# Patient Record
Sex: MALE | Race: OTHER | NOT HISPANIC OR LATINO | URBAN - METROPOLITAN AREA
[De-identification: names, ages, dates, MRNs, and addresses within clinical notes are randomized per-mention and may not be internally consistent; named-entity substitution may affect disease eponyms.]

---

## 2018-02-21 ENCOUNTER — EMERGENCY (EMERGENCY)
Facility: HOSPITAL | Age: 38
LOS: 1 days | Discharge: HOME | End: 2018-02-21
Attending: EMERGENCY MEDICINE | Admitting: EMERGENCY MEDICINE
Payer: COMMERCIAL

## 2018-02-21 VITALS
HEART RATE: 84 BPM | DIASTOLIC BLOOD PRESSURE: 75 MMHG | RESPIRATION RATE: 17 BRPM | TEMPERATURE: 99 F | OXYGEN SATURATION: 99 % | SYSTOLIC BLOOD PRESSURE: 114 MMHG

## 2018-02-21 PROCEDURE — 99283 EMERGENCY DEPT VISIT LOW MDM: CPT

## 2018-02-21 RX ORDER — ACETAMINOPHEN 500 MG
975 TABLET ORAL ONCE
Qty: 0 | Refills: 0 | Status: COMPLETED | OUTPATIENT
Start: 2018-02-21 | End: 2018-02-21

## 2018-02-21 RX ORDER — IBUPROFEN 200 MG
600 TABLET ORAL ONCE
Qty: 0 | Refills: 0 | Status: COMPLETED | OUTPATIENT
Start: 2018-02-21 | End: 2018-02-21

## 2018-02-21 RX ORDER — OXYCODONE HYDROCHLORIDE 5 MG/1
5 TABLET ORAL ONCE
Qty: 0 | Refills: 0 | Status: DISCONTINUED | OUTPATIENT
Start: 2018-02-21 | End: 2018-02-21

## 2018-02-21 RX ORDER — LIDOCAINE 4 G/100G
1 CREAM TOPICAL ONCE
Qty: 0 | Refills: 0 | Status: COMPLETED | OUTPATIENT
Start: 2018-02-21 | End: 2018-02-21

## 2018-02-21 RX ADMIN — Medication 600 MILLIGRAM(S): at 23:04

## 2018-02-21 RX ADMIN — Medication 975 MILLIGRAM(S): at 23:04

## 2018-02-21 RX ADMIN — LIDOCAINE 1 PATCH: 4 CREAM TOPICAL at 23:04

## 2018-02-21 RX ADMIN — Medication 60 MILLIGRAM(S): at 23:04

## 2018-02-21 NOTE — ED ADULT TRIAGE NOTE - CHIEF COMPLAINT QUOTE
Patient c/o right buttock pain radiating to right upper leg for 3 days. Patient had back surgery 2 years ago for herniated disk

## 2018-02-21 NOTE — ED PROVIDER NOTE - MEDICAL DECISION MAKING DETAILS
Attending note. Acute right lower back pain with paresthesias in the right thigh and buttocks. No suggestion of cauda equina. NSAIDs, Tylenol, steroids, Lidoderm patch, analgesia. Reassess in outpatient followup with spine center

## 2018-02-21 NOTE — ED ADULT NURSE NOTE - DISCHARGE TEACHING
Inge SANCHEZ, pt verbalizes understanding to f/u with PCP, Spine, and return to ED for any worsening symptoms.

## 2018-02-21 NOTE — ED PROVIDER NOTE - CARE PLAN
Principal Discharge DX:	Musculoskeletal pain  Assessment and plan of treatment:	REST INCREASE ACTIVITY AS TOLERATED  RETURN TO THE ER FOR ANY CONCERNS  TAKE MOTRIN 600 MG EVERY 8 HRS- TAKE WITH FOOD  PREDNISONE DAILY FOR 4 MORE DAYS  APPLY LIDODERM PATCH TO AFFECTED AREA FOR 12 HRS ON AND THEN 12 HRS OFF  RETURN TO THE ER FOR ANY CONCERNS  FOLLOW UP WITH DR LEE IN THE NEXT WEEK

## 2018-02-21 NOTE — ED PROVIDER NOTE - PLAN OF CARE
REST INCREASE ACTIVITY AS TOLERATED  RETURN TO THE ER FOR ANY CONCERNS  TAKE MOTRIN 600 MG EVERY 8 HRS- TAKE WITH FOOD  PREDNISONE DAILY FOR 4 MORE DAYS  APPLY LIDODERM PATCH TO AFFECTED AREA FOR 12 HRS ON AND THEN 12 HRS OFF  RETURN TO THE ER FOR ANY CONCERNS  FOLLOW UP WITH DR LEE IN THE NEXT WEEK

## 2018-02-21 NOTE — ED ADULT NURSE NOTE - OBJECTIVE STATEMENT
38 y/o M, [facility] surgery 2 years ago for herniated disk, presents to ED c/o burning pain to R buttock radiating to right posterior thigh x 3-4 days. Pt reports he took Motrin and Gabapentin yesterday for the pain with little relief and today did not take any meds. Pt denies bladder/bowel incontinence, weakness, or saddle anesthesia at this time. Pt able to ambulate with some pain. Pt reports pain is 2/10 when sitting and increased with laying down and standing/walking. Pt reports some tingling/decreased sensation to R buttock and R posterior thigh. Pt denies any recent injury or trauma. Safety maintained.  Family at bedside.

## 2018-02-21 NOTE — ED PROVIDER NOTE - PROGRESS NOTE DETAILS
Pt feeling better at this time, ambulating with steady gait- pt requesting to dc home. meds sent to pharmacy

## 2018-02-21 NOTE — ED PROVIDER NOTE - PHYSICAL EXAMINATION
Attending note. Patient is alert and in severe pain. Back is nontender to palpation. There are no skin lesions or rashes. Patient has tenderness in the right sciatic notch. Patient has full range of motion of his right hip without exacerbation of pain. Patient has paresthesia about the right thigh. There is no scrotal paresthesia or perianal paresthesia. Rectal exam was has normal tone. Straight leg raising is negative bilaterally. There is no clonus

## 2018-02-21 NOTE — ED PROVIDER NOTE - OBJECTIVE STATEMENT
36 yo male in room 6 accompanied by family here for evaluation of moderate to severe burning pain to right buttocks radiating to right posterior thigh for the past 3-4 days.  Pt states "I have a history of disc herniation to my lumbar spine and had surgery 2 years ago. I took motrin and gabapentin yesterday for the pain with little relief and today I did not take anything".  Pt denies  loss of bowel or bladder function, weakness, or saddle anesthesia at this time. Pt able to ambulate with some pain.  Pt reports some tingling to buttocks and posterior thigh. Pt denies recent injury or trauma. 38 yo male in room 6 accompanied by family here for evaluation of moderate to severe burning pain to right buttocks radiating to right posterior thigh for the past 3-4 days.  Pt states "I have a history of disc herniation to my lumbar spine and had surgery 2 years ago. I took motrin and gabapentin yesterday for the pain with little relief and today I did not take anything".  Pt denies  loss of bowel or bladder function, weakness, or saddle anesthesia at this time. Pt able to ambulate with some pain.  Pt reports some tingling to buttocks and posterior thigh. Pt denies recent injury or trauma.       Attending note. The patient was seen in fast track room #6. Agree with the above. Patient is complaining of atraumatic right lower back pain for 2-3 days. Pain is severe and radiates from the right lower back to the buttock to the thigh with paresthesia. Patient denies any bowel or bladder dysfunction. Patient has no saddle anesthesia. Patient has no fevers. Patient prior back surgery in 2016 at the hospital for joint disease. Patient Advil yesterday. Pain is exacerbated by movement.

## 2018-02-22 VITALS
TEMPERATURE: 98 F | OXYGEN SATURATION: 98 % | SYSTOLIC BLOOD PRESSURE: 136 MMHG | DIASTOLIC BLOOD PRESSURE: 79 MMHG | RESPIRATION RATE: 18 BRPM | HEART RATE: 68 BPM

## 2018-02-22 PROCEDURE — 99283 EMERGENCY DEPT VISIT LOW MDM: CPT

## 2018-02-22 RX ORDER — LIDOCAINE 4 G/100G
1 CREAM TOPICAL
Qty: 7 | Refills: 0 | OUTPATIENT
Start: 2018-02-22 | End: 2018-02-28

## 2018-02-22 RX ORDER — OXYCODONE HYDROCHLORIDE 5 MG/1
1 TABLET ORAL
Qty: 12 | Refills: 0 | OUTPATIENT
Start: 2018-02-22 | End: 2018-02-24

## 2018-02-22 RX ADMIN — OXYCODONE HYDROCHLORIDE 5 MILLIGRAM(S): 5 TABLET ORAL at 00:50

## 2018-02-22 RX ADMIN — Medication 975 MILLIGRAM(S): at 00:08

## 2018-02-22 RX ADMIN — OXYCODONE HYDROCHLORIDE 5 MILLIGRAM(S): 5 TABLET ORAL at 00:08

## 2018-02-22 RX ADMIN — Medication 600 MILLIGRAM(S): at 00:08

## 2018-02-22 NOTE — ED ADULT NURSE REASSESSMENT NOTE - NS ED NURSE REASSESS COMMENT FT1
Pt reports some improvement in R buttock/R posterior thigh pain. Pt reports 0/10 at rest and 10/10 burning sensation upon standing. Pt medicated as ordered. Resting comfortably in bed with family member at bedside. Safety maintained. Awaiting reassessment and dispo.

## 2018-02-27 ENCOUNTER — APPOINTMENT (OUTPATIENT)
Dept: ORTHOPEDIC SURGERY | Facility: CLINIC | Age: 38
End: 2018-02-27

## 2023-02-07 ENCOUNTER — APPOINTMENT (OUTPATIENT)
Dept: ORTHOPEDIC SURGERY | Facility: CLINIC | Age: 43
End: 2023-02-07
Payer: COMMERCIAL

## 2023-02-07 VITALS — HEIGHT: 72 IN | WEIGHT: 198 LBS | BODY MASS INDEX: 26.82 KG/M2

## 2023-02-07 DIAGNOSIS — Z78.9 OTHER SPECIFIED HEALTH STATUS: ICD-10-CM

## 2023-02-07 PROCEDURE — 99204 OFFICE O/P NEW MOD 45 MIN: CPT

## 2023-02-07 PROCEDURE — 73564 X-RAY EXAM KNEE 4 OR MORE: CPT | Mod: RT

## 2023-02-07 PROCEDURE — 99072 ADDL SUPL MATRL&STAF TM PHE: CPT

## 2023-02-07 NOTE — HISTORY OF PRESENT ILLNESS
[Gradual] : gradual [8] : 8 [1] : 2 [Dull/Aching] : dull/aching [de-identified] : 02/07/2023 pt is 42 years old female who present evaluation of randa knees. History of 2 meniscus surgeries. Has had PT on both knees. Started PT 8-9 months with no relieve. Initially had good relieve. States 8-9 months ago the pain began. Now has left knee pain. States when active the knees will start swelling and have pain and is unable to be active.\par \par Lateral arthroscopy and medial arthroscopy 12 years and 6 years ago\par \par

## 2023-02-07 NOTE — ASSESSMENT
[FreeTextEntry1] : 42 year M with right knee OA and left knee concern for meniscus tear. Pt has failed conservative tx with PT/NSAIDs.\par MRI for Meniscal tear and fu after completion.

## 2023-02-07 NOTE — IMAGING
[de-identified] : Constitutional: well developed and well nourished, able to communicate\par Cardiovascular: Peripheral vascular exam is grossly normal\par Neurologic: Alert and oriented, no acute distress.\par Skin: normal skin with no ulcers, rashes, or lesions\par Pulmonary: No respiratory distress, breathing comfortably on room air\par Lymphatics: No obvious lymphadenopathy or lymphedema in areas examined\par  \par RIght Knee Exam\par Alignment:  Valgus\par Effusion: None\par Atrophy: None                                                \par Stable to Varus/valgus stress\par Posterior Drawer Test: negative\par Anterior Drawer Test: Negative\par Knee Extension/Flexion: 0 / 120\par \par Medial/lateral compartments\par Medial joint line: No Tenderness\par Lateral joint line: No Tenderness\par Wesley test: negative\par \par Patellofemoral joint\par Medial patellar facet: no tenderness\par Patellar grind: Negative\par \par Tendons:\par Pes Anserine: No tenderness\par Gerdy’s Tubercle/ IT Band: No tenderness\par Quadriceps Tendon: No Tenderness\par patellar tendon: no Tenderness\par Tibial tubercle: not tenderness\par Calf: no Tenderness\par \par Neurovascular exam\par Muscle strength: 5/5\par Sensation to light touch: intact\par Distal pulses: 2+\par \par LEFT KNEE EXAM\par Alignment: Neutral\par Effusion: None\par Atrophy: None                                                 \par Stable to Varus/valgus stress\par Posterior Drawer Test: negative\par Anterior Drawer Test: Negative\par Knee Extension/Flexion: 0 / 120\par \par Medial/lateral compartments\par Medial joint line: POS Tenderness\par Lateral joint line: No Tenderness\par Wesley test: negative\par \par Patellofemoral joint\par Medial patellar facet: no tenderness\par Patellar grind: Negative\par \par Tendons:\par Pes Anserine: No tenderness\par Gerdy’s Tubercle/ IT Band: No tenderness\par Quadriceps Tendon: No Tenderness\par patellar tendon: no Tenderness\par Tibial tubercle: not tenderness\par Calf: no Tenderness\par \par Neurovascular exam\par Muscle strength: 5/5\par Sensation to light touch: intact\par Distal pulses: 2+\par \par IMAGING:\par 02/07/2023 Xrays of the bilateral Knee were taken demonstrating Right knee lateral joint space collapse with osteophytes. mild PF OA. Left knee no signs of fractures, dislocations,or significant arthritis. \par

## 2023-02-28 ENCOUNTER — APPOINTMENT (OUTPATIENT)
Dept: ORTHOPEDIC SURGERY | Facility: CLINIC | Age: 43
End: 2023-02-28

## 2023-03-15 ENCOUNTER — RESULT REVIEW (OUTPATIENT)
Age: 43
End: 2023-03-15

## 2023-03-28 ENCOUNTER — APPOINTMENT (OUTPATIENT)
Dept: ORTHOPEDIC SURGERY | Facility: CLINIC | Age: 43
End: 2023-03-28
Payer: COMMERCIAL

## 2023-03-28 VITALS — BODY MASS INDEX: 26.82 KG/M2 | WEIGHT: 198 LBS | HEIGHT: 72 IN

## 2023-03-28 DIAGNOSIS — M17.11 UNILATERAL PRIMARY OSTEOARTHRITIS, RIGHT KNEE: ICD-10-CM

## 2023-03-28 PROCEDURE — 99214 OFFICE O/P EST MOD 30 MIN: CPT

## 2023-03-28 NOTE — IMAGING
[de-identified] : Constitutional: well developed and well nourished, able to communicate\par Cardiovascular: Peripheral vascular exam is grossly normal\par Neurologic: Alert and oriented, no acute distress.\par Skin: normal skin with no ulcers, rashes, or lesions\par Pulmonary: No respiratory distress, breathing comfortably on room air\par Lymphatics: No obvious lymphadenopathy or lymphedema in areas examined\par  \par RIght Knee Exam\par Alignment:  Valgus\par Effusion: None\par Atrophy: None                                                \par Stable to Varus/valgus stress\par Posterior Drawer Test: negative\par Anterior Drawer Test: Negative\par Knee Extension/Flexion: 0 / 120\par \par Medial/lateral compartments\par Medial joint line: No Tenderness\par Lateral joint line: No Tenderness\par Wesley test: negative\par \par Patellofemoral joint\par Medial patellar facet: no tenderness\par Patellar grind: Negative\par \par Tendons:\par Pes Anserine: No tenderness\par Gerdy’s Tubercle/ IT Band: No tenderness\par Quadriceps Tendon: No Tenderness\par patellar tendon: no Tenderness\par Tibial tubercle: not tenderness\par Calf: no Tenderness\par \par Neurovascular exam\par Muscle strength: 5/5\par Sensation to light touch: intact\par Distal pulses: 2+\par \par LEFT KNEE EXAM\par Alignment: Neutral\par Effusion: None\par Atrophy: None                                                 \par Stable to Varus/valgus stress\par Posterior Drawer Test: negative\par Anterior Drawer Test: Negative\par Knee Extension/Flexion: 0 / 120\par \par Medial/lateral compartments\par Medial joint line: POS Tenderness\par Lateral joint line: No Tenderness\par Wesley test: negative\par \par Patellofemoral joint\par Medial patellar facet: no tenderness\par Patellar grind: Negative\par \par Tendons:\par Pes Anserine: No tenderness\par Gerdy’s Tubercle/ IT Band: No tenderness\par Quadriceps Tendon: No Tenderness\par patellar tendon: no Tenderness\par Tibial tubercle: not tenderness\par Calf: no Tenderness\par \par Neurovascular exam\par Muscle strength: 5/5\par Sensation to light touch: intact\par Distal pulses: 2+\par \par IMAGING:\par 02/07/2023 Xrays of the bilateral Knee were taken demonstrating Right knee lateral joint space collapse with osteophytes. mild PF OA. Left knee no signs of fractures, dislocations,or significant arthritis. \par \par MRI of the left knee - focal defect chondral lateral condyle, questionable meniscus tear medial

## 2023-03-28 NOTE — ASSESSMENT
[FreeTextEntry1] : 42 year M with right knee OA and left knee concern for meniscus tear. Pt has failed conservative tx with PT/NSAIDs.\par MRI for Meniscal tear and fu after completion.\par \par 03/28/2023 follow up with sports medicine for consideration for chondral procedures for left knee. Right knee discussed realignnment procedurs vs PKA vs TKA in the future.

## 2023-03-28 NOTE — HISTORY OF PRESENT ILLNESS
[Gradual] : gradual [8] : 8 [1] : 2 [Dull/Aching] : dull/aching [de-identified] : 02/07/2023 pt is 42 years old female who present evaluation of randa knees. History of 2 meniscus surgeries. Has had PT on both knees. Started PT 8-9 months with no relieve. Initially had good relieve. States 8-9 months ago the pain began. Now has left knee pain. States when active the knees will start swelling and have pain and is unable to be active.\par \par Lateral arthroscopy and medial arthroscopy 12 years and 6 years ago\par 03/28/2023 follow up MRI \par  [] : no

## 2023-03-30 ENCOUNTER — APPOINTMENT (OUTPATIENT)
Dept: ORTHOPEDIC SURGERY | Facility: CLINIC | Age: 43
End: 2023-03-30
Payer: COMMERCIAL

## 2023-03-30 PROCEDURE — 99214 OFFICE O/P EST MOD 30 MIN: CPT

## 2023-03-30 NOTE — IMAGING
[de-identified] : \par LEFT KNEE\par Inspection:  mild effusion\par Palpation: lateral femoral condyle tenderness \par Knee Range of Motion:  0-130 \par Strength: 5/5 Quadriceps strength, 5/5 Hamstring strength\par Neurological: light touch is intact throughout\par Ligament Stability and Special Tests: \par McMurrays: Positive\par Lachman: neg\par Pivot Shift: neg\par Posterior Drawer: neg\par Valgus: neg\par Varus: neg\par Patella Apprehension: neg\par Patella Maltracking: neg\par \par

## 2023-03-30 NOTE — HISTORY OF PRESENT ILLNESS
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 )   chronic left knee pain since 2022 worseing since Feb 2023.  seen dr. Monaco for both knees and sent for consult for left knee chondral defect.\par The pain is located  lateral, ant, and deep\par The pain is associated with  swelling, catching and buckling\par Worse with activity and better at rest.\par Has tried PT sine june 2022\par H/O opposite right knee arthritis seens Dr. Monaco for\par h/o right meniscus surgery\par

## 2023-03-30 NOTE — DISCUSSION/SUMMARY
[de-identified] : The patient was advised of the diagnosis. The natural history of the pathology was explained in full to the patient in layman's terms. Several different treatment options were discussed and explained to the patient including the risks and benefits of both surgical and non-surgical treatments.\par \par The risks, benefits, and alternatives to surgical arthroscopy of the left knee with abrasion chondroplasty, synovectomy, possible partial meniscotomy, and articular cartilage biopsy (for subsequent autologous chondrocyte implantation) were reviewed with the patient.   Specifically, I reviewed that any anterior knee pain may paradoxically worsen for the first six weeks following arthroscopy due to quadriceps weakness.  We also discussed that the risks of surgery include but are not limited to pain, infection (superficial or deep), bleeding, vascular injury, numbness, tingling, nerve damage (direct or indirect), scarring, wound breakdown, failure to resolve symptoms, symptom recurrence, the need for further surgery as well as medical complications such as blood clots, pulmonary embolism, heart attack, stroke, and other anesthesia complications including even death.  \par \par They understood all the risks, accepted them, and understood that other complications could occur that are not mentioned above.  The intraoperative plan, post-operative plan, post-operative expectations and limitations were explained in full.  Expectations from non-surgical treatment were explained in full as well.  They demonstrated a complete understanding of the treatment alternatives and requested to proceed with surgery.  This will be scheduled accordingly.\par

## 2023-03-30 NOTE — ASSESSMENT
[FreeTextEntry1] : notes from dr. cheema reviewed\par Imaging was reviewed and independently interpreted\par xrays b/l knee 2/7/23 - right knee severe lat deg changes, left knee mild deg pf\par mri left knee ZP 3/15/23 - focal chondral defect of the anterior LFC into trochlea 1.7cm AP X 1.2cm ML, undersurface MMT, synov\par \par \par - We discussed their diagnosis and treatment options at length including the risks and benefits of both surgical and non-surgical options.\par - Given their active lifestyle along with continued pain and mechanical symptoms despite conservative treatment they are a surgical candidate.\par - The risks, benefits, and alternatives to left knee abras (LFC), poss PMM, synov, cartilage biopsy were discussed with the patient, all questions were answered.\par

## 2023-03-30 NOTE — DATA REVIEWED
[MRI] : MRI [Outside X-rays] : outside x-rays [Left] : left [Knee] : knee [I independently reviewed and interpreted images and report] : I independently reviewed and interpreted images and report

## 2023-04-14 ENCOUNTER — APPOINTMENT (OUTPATIENT)
Age: 43
End: 2023-04-14
Payer: COMMERCIAL

## 2023-04-14 PROCEDURE — 29870 ARTHRS KNEE DX W/WO SYN BX: CPT | Mod: AS,59,LT

## 2023-04-14 PROCEDURE — 29879 ARTHRS KNE SRG ABRASJ ARTHRP: CPT | Mod: 59,LT

## 2023-04-14 PROCEDURE — 29875 ARTHRS KNEE SURG SYNVCT LMTD: CPT | Mod: 59,LT

## 2023-04-14 PROCEDURE — 29870 ARTHRS KNEE DX W/WO SYN BX: CPT | Mod: 59,LT

## 2023-04-14 PROCEDURE — 29875 ARTHRS KNEE SURG SYNVCT LMTD: CPT | Mod: AS,59,LT

## 2023-04-14 PROCEDURE — 29879 ARTHRS KNE SRG ABRASJ ARTHRP: CPT | Mod: AS,59,LT

## 2023-04-14 PROCEDURE — 29881 ARTHRS KNE SRG MNISECTMY M/L: CPT | Mod: AS,LT

## 2023-04-14 PROCEDURE — 29881 ARTHRS KNE SRG MNISECTMY M/L: CPT | Mod: LT

## 2023-04-14 RX ORDER — ACETAMINOPHEN 500 MG/1
500 TABLET ORAL 3 TIMES DAILY
Qty: 90 | Refills: 0 | Status: ACTIVE | COMMUNITY
Start: 2023-04-14 | End: 1900-01-01

## 2023-04-14 RX ORDER — IBUPROFEN 600 MG/1
600 TABLET, FILM COATED ORAL EVERY 6 HOURS
Qty: 20 | Refills: 0 | Status: ACTIVE | COMMUNITY
Start: 2023-04-14 | End: 1900-01-01

## 2023-04-14 RX ORDER — PROMETHAZINE HYDROCHLORIDE 12.5 MG/1
12.5 TABLET ORAL EVERY 6 HOURS
Qty: 20 | Refills: 0 | Status: ACTIVE | COMMUNITY
Start: 2023-04-14 | End: 1900-01-01

## 2023-04-14 RX ORDER — OXYCODONE 5 MG/1
5 TABLET ORAL
Qty: 10 | Refills: 0 | Status: ACTIVE | COMMUNITY
Start: 2023-04-14 | End: 1900-01-01

## 2023-04-20 ENCOUNTER — APPOINTMENT (OUTPATIENT)
Dept: ORTHOPEDIC SURGERY | Facility: CLINIC | Age: 43
End: 2023-04-20
Payer: COMMERCIAL

## 2023-04-20 VITALS — WEIGHT: 198 LBS | HEIGHT: 72 IN | BODY MASS INDEX: 26.82 KG/M2

## 2023-04-20 PROCEDURE — 99024 POSTOP FOLLOW-UP VISIT: CPT

## 2023-04-20 NOTE — ASSESSMENT
[FreeTextEntry1] : s/p L knee abras (C 1.8cm AP X 1.4cm ML), PMM, synov, biopsy on 4/14/23\par \par - PT on post op protocol\par - The patient was provided with a prescription for Physical Therapy \par - Home exercises program learned at physical therapy.\par - The patient was advised to apply ice (wrapped in a towel or protective covering) to the area daily (20 minutes at a time, 2-4X/day).\par - fu 8 week re-eval\par \par - also discussed the need for 2nd surgery in the future for ACI as he has a complicated injury with risk to pose threat to the overall function of his knee if not treated\par - r/b/a L knee ACI ( [facility] [place]) discussed at length and he wants to proceed in aug\par

## 2023-04-20 NOTE — DISCUSSION/SUMMARY
[de-identified] : The patient  was advised of the diagnosis. The natural history of the pathology was explained in full in layman's terms. Several different treatment options were discussed and explained including the risks and benefits of both surgical and non-surgical treatments.\par \par The risks, benefits, and alternatives to left knee open autologous chondrocyte implantation were reviewed. Specifically, I reviewed that there is an extensive post-op rehab course and immediate post-op restrictions. \par \par We also discussed that the risks of surgery include but are not limited to pain, infection (superficial or deep), bleeding, vascular injury, numbness, tingling, nerve damage (direct or indirect), scarring, non-healing, wound breakdown, failure to resolve symptoms, symptom recurrence, the need for further surgery, as well as medical complications such as blood clots, pulmonary embolism, heart attack, stroke, and other anesthesia complications including even death.  We also discussed that there could be injury to the infrapatellar branch of the saphenous nerve, causing permanent numbness near the incision.  They understood all the risks, accepted them, and understood that other complications could occur that are not mentioned above.  \par \par The intraoperative plan, post-operative plan, post-operative expectations and limitations were explained in full. The importance of postoperative rehabilitation and restriction compliance was emphasized. Expectations from non-surgical treatment were explained in full as well.  They demonstrated a complete understanding of the treatment and alternatives.\par

## 2023-04-20 NOTE — HISTORY OF PRESENT ILLNESS
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  seen dr. Monaco for both knees and sent for consult for left knee chondral defect.\par The pain is located medial, lateral, ant, and deep\par The pain is associated with  swelling, catching and buckling\par Worse with activity and better at rest.\par Has tried PT sine june 2022\par H/O opposite right knee arthritis seens Dr. Monaco for\par h/o right meniscus surgery\par \par ***s/p L knee abras (MFC 1.8cm AP X 1.4cm ML), PMM, synov, biopsy on 4/14/23***\par \par 4/20/23 - po visit, no new issues

## 2023-04-20 NOTE — IMAGING
[de-identified] : \par  LEFT KNEE\par Inspection:  mild effusion, incisions c/d/i\par Palpation: medial facet ttp and MFC\par Knee Range of Motion:  0-120\par Strength: 4/5 Quadriceps strength, 5/5 Hamstring strength\par Neurological: light touch is intact throughout\par Ligament Stability and Special Tests: \par McMurrays: neg\par Lachman: neg\par Pivot Shift: neg\par Posterior Drawer: neg\par Valgus: neg\par Varus: neg\par Patella Apprehension: neg\par Patella Maltracking: neg\par

## 2023-05-09 ENCOUNTER — APPOINTMENT (OUTPATIENT)
Dept: ORTHOPEDIC SURGERY | Facility: CLINIC | Age: 43
End: 2023-05-09

## 2023-06-15 ENCOUNTER — APPOINTMENT (OUTPATIENT)
Dept: ORTHOPEDIC SURGERY | Facility: CLINIC | Age: 43
End: 2023-06-15
Payer: COMMERCIAL

## 2023-06-15 VITALS — WEIGHT: 205 LBS | BODY MASS INDEX: 28.7 KG/M2 | HEIGHT: 71 IN

## 2023-06-15 PROCEDURE — 99024 POSTOP FOLLOW-UP VISIT: CPT

## 2023-06-15 NOTE — IMAGING
[de-identified] : \par  LEFT KNEE\par Inspection:  mild effusion, incisions c/d/i\par Palpation: medial facet ttp and MFC\par Knee Range of Motion:  0-135\par Strength: 5/5 Quadriceps strength, 5/5 Hamstring strength\par Neurological: light touch is intact throughout\par Ligament Stability and Special Tests: \par McMurrays: neg\par Lachman: neg\par Pivot Shift: neg\par Posterior Drawer: neg\par Valgus: neg\par Varus: neg\par Patella Apprehension: neg\par Patella Maltracking: neg\par

## 2023-06-15 NOTE — DISCUSSION/SUMMARY
[de-identified] : The patient  was advised of the diagnosis. The natural history of the pathology was explained in full in layman's terms. Several different treatment options were discussed and explained including the risks and benefits of both surgical and non-surgical treatments.\par \par The risks, benefits, and alternatives to left knee open autologous chondrocyte implantation were reviewed. Specifically, I reviewed that there is an extensive post-op rehab course and immediate post-op restrictions. \par \par We also discussed that the risks of surgery include but are not limited to pain, infection (superficial or deep), bleeding, vascular injury, numbness, tingling, nerve damage (direct or indirect), scarring, non-healing, wound breakdown, failure to resolve symptoms, symptom recurrence, the need for further surgery, as well as medical complications such as blood clots, pulmonary embolism, heart attack, stroke, and other anesthesia complications including even death.  We also discussed that there could be injury to the infrapatellar branch of the saphenous nerve, causing permanent numbness near the incision.  They understood all the risks, accepted them, and understood that other complications could occur that are not mentioned above.  \par \par The intraoperative plan, post-operative plan, post-operative expectations and limitations were explained in full. The importance of postoperative rehabilitation and restriction compliance was emphasized. Expectations from non-surgical treatment were explained in full as well.  They demonstrated a complete understanding of the treatment and alternatives.\par

## 2023-06-15 NOTE — HISTORY OF PRESENT ILLNESS
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  seen dr. Monaco for both knees and sent for consult for left knee chondral defect.\par The pain is located medial, lateral, ant, and deep\par The pain is associated with  swelling, catching and buckling\par Worse with activity and better at rest.\par Has tried PT sine june 2022\par H/O opposite right knee arthritis seens Dr. Monaco for\par h/o right meniscus surgery\par \par ***s/p L knee abras (MFC 1.8cm AP X 1.4cm ML), PMM, synov, biopsy on 4/14/23***\par \par 4/20/23 - po visit, no new issues\par 6/15/23 - doing PT/HEP in [place] still some pain in knee

## 2023-06-15 NOTE — ASSESSMENT
[FreeTextEntry1] : s/p L knee abras (MFC 1.8cm AP X 1.4cm ML), PMM, synov, biopsy on 4/14/23\par \par \par - The patient was advised of the diagnosis.  The natural history of the pathology was explained to the patient in layman's terms.  Several different treatment options were discussed and explained including the risks and benefits of both surgical and non-surgical treatments.  All questions and concerns were answered.\par - cont PT /HEP on post op protocol\par -  discussed the need for 2nd surgery in the future for ACI as he has a complicated injury with risk to pose threat to the overall function of his knee if not treated\par - r/b/a L knee ACI ( [facility] [place] [place]) discussed at length and he wants to proceed \par

## 2023-07-28 ENCOUNTER — APPOINTMENT (OUTPATIENT)
Age: 43
End: 2023-07-28
Payer: COMMERCIAL

## 2023-07-28 PROCEDURE — 27412 AUTOCHONDROCYTE IMPLANT KNEE: CPT | Mod: LT

## 2023-07-28 PROCEDURE — 27412 AUTOCHONDROCYTE IMPLANT KNEE: CPT | Mod: AS,LT

## 2023-07-28 RX ORDER — PROMETHAZINE HYDROCHLORIDE 12.5 MG/1
12.5 TABLET ORAL EVERY 6 HOURS
Qty: 20 | Refills: 0 | Status: ACTIVE | COMMUNITY
Start: 2023-07-28 | End: 1900-01-01

## 2023-07-28 RX ORDER — ACETAMINOPHEN 500 MG/1
500 TABLET ORAL 3 TIMES DAILY
Qty: 90 | Refills: 0 | Status: ACTIVE | COMMUNITY
Start: 2023-07-28 | End: 1900-01-01

## 2023-07-28 RX ORDER — IBUPROFEN 600 MG/1
600 TABLET ORAL EVERY 6 HOURS
Qty: 20 | Refills: 0 | Status: ACTIVE | COMMUNITY
Start: 2023-07-28 | End: 1900-01-01

## 2023-07-28 RX ORDER — OXYCODONE 5 MG/1
5 TABLET ORAL
Qty: 30 | Refills: 0 | Status: ACTIVE | COMMUNITY
Start: 2023-07-28 | End: 1900-01-01

## 2023-07-28 RX ORDER — ASPIRIN/CALCIUM/MAG/ALUMINUM 325 MG
325 TABLET ORAL
Qty: 30 | Refills: 0 | Status: ACTIVE | COMMUNITY
Start: 2023-07-28 | End: 1900-01-01

## 2023-08-03 ENCOUNTER — APPOINTMENT (OUTPATIENT)
Dept: ORTHOPEDIC SURGERY | Facility: CLINIC | Age: 43
End: 2023-08-03
Payer: COMMERCIAL

## 2023-08-03 DIAGNOSIS — M23.8X2 OTHER INTERNAL DERANGEMENTS OF LEFT KNEE: ICD-10-CM

## 2023-08-03 PROCEDURE — 99024 POSTOP FOLLOW-UP VISIT: CPT

## 2023-08-03 NOTE — ASSESSMENT
[FreeTextEntry1] :  s/p L knee MARICRUZ ([facility]) on 7/28/23  - PT on post op protocol - The patient was provided with a prescription for Physical Therapy  - Home exercises program learned at physical therapy. - The patient was advised to apply ice (wrapped in a towel or protective covering) to the area daily (20 minutes at a time, 2-4X/day). - fu 8 week re-eval

## 2023-08-03 NOTE — IMAGING
[de-identified] :   LEFT KNEE Inspection:  incisions c/d/i,  mod effusion  Palpation: no ttp Knee Range of Motion:  0-60 degrees Strength: 4/5 Quadriceps strength, 4+/5 Hamstring strength Neurological: light touch is intact throughout Ligament Stability and Special Tests:  McMurrays: neg Lachman: neg Pivot Shift: neg Posterior Drawer: neg Valgus: neg Varus: neg Patella Apprehension: neg Patella Maltracking: neg

## 2023-08-03 NOTE — HISTORY OF PRESENT ILLNESS
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  seen dr. Monaco for both knees and sent for consult for left knee chondral defect. The pain is located medial, lateral, ant, and deep The pain is associated with  swelling, catching and buckling Worse with activity and better at rest. Has tried PT sine june 2022 H/O opposite right knee arthritis seens Dr. Monaco for h/o right meniscus surgery  ***s/p L knee abras (C 1.8cm AP X 1.4cm ML), PMM, synov, biopsy on 4/14/23***  4/20/23 - po visit, no new issues 6/15/23 - doing PT/HEP in [place] still some pain in knee  *** s/p L knee MARICRUZ ([facility]) on 7/28/23***  8/3/23 - po visit , using brace and crtuches, pain well controlled

## 2023-09-28 ENCOUNTER — APPOINTMENT (OUTPATIENT)
Dept: ORTHOPEDIC SURGERY | Facility: CLINIC | Age: 43
End: 2023-09-28
Payer: COMMERCIAL

## 2023-09-28 PROCEDURE — 99024 POSTOP FOLLOW-UP VISIT: CPT

## 2023-11-16 ENCOUNTER — APPOINTMENT (OUTPATIENT)
Dept: ORTHOPEDIC SURGERY | Facility: CLINIC | Age: 43
End: 2023-11-16
Payer: COMMERCIAL

## 2023-11-16 PROCEDURE — 99213 OFFICE O/P EST LOW 20 MIN: CPT

## 2024-01-11 ENCOUNTER — APPOINTMENT (OUTPATIENT)
Dept: ORTHOPEDIC SURGERY | Facility: CLINIC | Age: 44
End: 2024-01-11
Payer: COMMERCIAL

## 2024-01-11 VITALS — HEIGHT: 71 IN | BODY MASS INDEX: 28.7 KG/M2 | WEIGHT: 205 LBS

## 2024-01-11 PROCEDURE — 99213 OFFICE O/P EST LOW 20 MIN: CPT

## 2024-01-11 NOTE — IMAGING
[de-identified] :  LEFT KNEE Inspection:  well healed surg scars, mild effusion  Palpation: medial facet ttp Knee Range of Motion:  0-130 degrees Strength: 5-/5 Quadriceps strength, 5-/5 Hamstring strength, 4/5 hip abductors Neurological: light touch is intact throughout Ligament Stability and Special Tests:  McMurrays: neg Lachman: neg Pivot Shift: neg Posterior Drawer: neg Valgus: neg Varus: neg Patella Apprehension: neg Patella Maltracking: neg

## 2024-01-11 NOTE — HISTORY OF PRESENT ILLNESS
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  seen dr. Monaco for both knees and sent for consult for left knee chondral defect. The pain is located medial, lateral, ant, and deep The pain is associated with  swelling, catching and buckling Worse with activity and better at rest. Has tried PT sine june 2022 H/O opposite right knee arthritis seens Dr. Monaco for h/o right meniscus surgery  ***s/p L knee abras (C 1.8cm AP X 1.4cm ML), PMM, synov, biopsy on 4/14/23***  4/20/23 - po visit, no new issues 6/15/23 - doing PT/HEP in [place] still some pain in knee  *** [place] [place]***  8/3/23 - po visit , using brace and crtuches, pain well controlled 9/28/23 - doing PT on protocol 11/16/23 - cont with PT and HEP working with Birgit  1/11/24- cont with PT and HEP, improving

## 2024-01-11 NOTE — ASSESSMENT
[FreeTextEntry1] : s/p L knee MARICRUZ ([facility] [place]) on 7/28/23   - The patient was advised of the diagnosis. The natural history of the pathology was explained to the patient in layman's terms. Several different treatment options were discussed and explained including the risks and benefits of both surgical and non-surgical treatments. All questions and concerns were answered. - We will continue conservative treatment with PT, icing, and anti-inflammatory medications. - The patient was provided with a prescription for Physical Therapy - Home exercises program learned at physical therapy. - The patient was advised to apply ice (wrapped in a towel or protective covering) to the area daily (20 minutes at a time, 2-4X/day). - fu 8 week re-eval.

## 2024-03-07 ENCOUNTER — APPOINTMENT (OUTPATIENT)
Dept: ORTHOPEDIC SURGERY | Facility: CLINIC | Age: 44
End: 2024-03-07
Payer: COMMERCIAL

## 2024-03-07 PROCEDURE — 99213 OFFICE O/P EST LOW 20 MIN: CPT

## 2024-03-07 NOTE — IMAGING
[de-identified] :   LEFT KNEE Inspection:  well healed surg scars, mild effusion  Palpation: medial facet ttp Knee Range of Motion:  0-130 degrees Strength: 5-/5 Quadriceps strength, 5-/5 Hamstring strength, 4/5 hip abductors Neurological: light touch is intact throughout Ligament Stability and Special Tests:  McMurrays: neg Lachman: neg Pivot Shift: neg Posterior Drawer: neg Valgus: neg Varus: neg Patella Apprehension: neg Patella Maltracking: neg

## 2024-03-07 NOTE — HISTORY OF PRESENT ILLNESS
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  seen dr. Monaco for both knees and sent for consult for left knee chondral defect. The pain is located medial, lateral, ant, and deep The pain is associated with  swelling, catching and buckling Worse with activity and better at rest. Has tried PT sine june 2022 H/O opposite right knee arthritis seens Dr. Monaco for h/o right meniscus surgery  ***s/p L knee abras (C 1.8cm AP X 1.4cm ML), PMM, synov, biopsy on 4/14/23***  4/20/23 - po visit, no new issues 6/15/23 - doing PT/HEP in [place] still some pain in knee  *** s/p L knee MARICRUZ ([facility] [place] on 7/28/23***  8/3/23 - po visit , using brace and crtuches, pain well controlled 9/28/23 - doing PT on protocol 11/16/23 - cont with PT and HEP working with Birgit  1/11/24- cont with PT and HEP, improving 3/7/24- L knee improving although has some occasional discomfort with stairs and achiness with activtiy

## 2024-03-07 NOTE — ASSESSMENT
[FreeTextEntry1] : s/p L knee MARICRUZ ([facility] [place]) on 7/28/23   - The patient was advised of the diagnosis. The natural history of the pathology was explained to the patient in layman's terms. Several different treatment options were discussed and explained including the risks and benefits of both surgical and non-surgical treatments. All questions and concerns were answered. - We will continue conservative treatment with PT, icing, and anti-inflammatory medications. - The patient was provided with a prescription for Physical Therapy - Home exercises program learned at physical therapy. - The patient was advised to apply ice (wrapped in a towel or protective covering) to the area daily (20 minutes at a time, 2-4X/day). - fu 8 week re-eval, if cont symptoms consider visco injections

## 2024-05-02 ENCOUNTER — APPOINTMENT (OUTPATIENT)
Dept: ORTHOPEDIC SURGERY | Facility: CLINIC | Age: 44
End: 2024-05-02
Payer: COMMERCIAL

## 2024-05-02 DIAGNOSIS — M23.92 UNSPECIFIED INTERNAL DERANGEMENT OF LEFT KNEE: ICD-10-CM

## 2024-05-02 DIAGNOSIS — M22.2X2 PATELLOFEMORAL DISORDERS, LEFT KNEE: ICD-10-CM

## 2024-05-02 DIAGNOSIS — M17.12 UNILATERAL PRIMARY OSTEOARTHRITIS, LEFT KNEE: ICD-10-CM

## 2024-05-02 PROCEDURE — 73564 X-RAY EXAM KNEE 4 OR MORE: CPT | Mod: LT

## 2024-05-02 PROCEDURE — 99214 OFFICE O/P EST MOD 30 MIN: CPT

## 2024-05-02 NOTE — IMAGING
[de-identified] :   LEFT KNEE Inspection:  well healed surg scars, mild effusion  Palpation: medial facet ttp Knee Range of Motion:  0-130 degrees Strength: 5-/5 Quadriceps strength, 5-/5 Hamstring strength, 4/5 hip abductors Neurological: light touch is intact throughout Ligament Stability and Special Tests:  McMurrays: neg Lachman: neg Pivot Shift: neg Posterior Drawer: neg Valgus: neg Varus: neg Patella Apprehension: neg Patella Maltracking: neg

## 2024-05-02 NOTE — HISTORY OF PRESENT ILLNESS
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  seen dr. Monaco for both knees and sent for consult for left knee chondral defect. The pain is located medial, lateral, ant, and deep The pain is associated with  swelling, catching and buckling Worse with activity and better at rest. Has tried PT sine june 2022 H/O opposite right knee arthritis seens Dr. Monaco for h/o right meniscus surgery  ***s/p L knee abras (C 1.8cm AP X 1.4cm ML), PMM, synov, biopsy on 4/14/23***  4/20/23 - po visit, no new issues 6/15/23 - doing PT/HEP in [place] still some pain in knee  *** s/p L knee MARICRUZ ([facility] [place]) on 7/28/23***  8/3/23 - po visit , using brace and crtuches, pain well controlled 9/28/23 - doing PT on protocol 11/16/23 - cont with PT and HEP working with Birgit  1/11/24- cont with PT and HEP, improving 3/7/24- L knee improving although has some occasional discomfort with stairs and achiness with activtiy 5/2/24- L knee improving, complains of some discomfort with attempting to jog, has been doing HEP

## 2024-05-02 NOTE — ASSESSMENT
[FreeTextEntry1] : s/p L knee MARICRUZ ([facility]) on 7/28/23  Left X-Ray Examination of the KNEE (4 views): there are no fractures, subluxations or dislocations. mild pf deg   - The patient was advised of the diagnosis. The natural history of the pathology was explained to the patient in layman's terms. Several different treatment options were discussed and explained including the risks and benefits of both surgical and non-surgical treatments. All questions and concerns were answered. - We will continue conservative treatment with PT, icing, and anti-inflammatory medications. - The patient was provided with a prescription for Physical Therapy - Home exercises program learned at physical therapy. - The patient was advised to apply ice (wrapped in a towel or protective covering) to the area daily (20 minutes at a time, 2-4X/day). - new mri eval chondral defect for fill or new tearing since not improving with PT -fu after mri  (can considier CSI or visco injections we spoke about )   Medication Discussion: 1) We discussed a comprehensive treatment plan that included possible pharmaceutical management involving the use of prescription strength medications including but not limited to options such as oral Naprosyn 500mg BID, once daily Meloxicam 15 mg, or 500-650 mg Tylenol versus over the counter oral medications in addition to discussing possible topical prescription Pennsaid vs  Voltaren gel. 2) There is a moderate risk of morbidity with further treatment, especially from use of prescription strength medications and possible side effects of these medications which include but are not limited to upset stomach with oral medications, skin reactions to topical medications and GI/cardiac/renal issues with long term use. 3) I recommended that the patient follow-up with their medical physician if there are any significant potential issues with long term medication use such as interactions with current medications or with exacerbation of underlying medical comorbidities. 4) The benefits and risks associated with use of oral and / or topical prescription and over the counter anti-inflammatory medications were discussed with the patient. The patient voiced understanding of the risks including but not limited to bleeding, stroke, kidney dysfunction, heart disease, and were referred to the black box warning label for further information.

## 2024-06-06 ENCOUNTER — RESULT REVIEW (OUTPATIENT)
Age: 44
End: 2024-06-06

## 2024-06-10 PROBLEM — M65.9 SYNOVITIS OF KNEE: Status: ACTIVE | Noted: 2024-06-10

## 2024-06-10 PROBLEM — S83.32XD TEAR OF ARTICULAR CARTILAGE OF LEFT KNEE AS CURRENT INJURY, SUBSEQUENT ENCOUNTER: Status: ACTIVE | Noted: 2023-04-18

## 2024-06-10 PROBLEM — S83.232A COMPLEX TEAR OF MEDIAL MENISCUS OF LEFT KNEE AS CURRENT INJURY, INITIAL ENCOUNTER: Status: ACTIVE | Noted: 2024-06-10

## 2024-06-11 ENCOUNTER — APPOINTMENT (OUTPATIENT)
Dept: ORTHOPEDIC SURGERY | Facility: CLINIC | Age: 44
End: 2024-06-11
Payer: COMMERCIAL

## 2024-06-11 DIAGNOSIS — M65.9 SYNOVITIS AND TENOSYNOVITIS, UNSPECIFIED: ICD-10-CM

## 2024-06-11 DIAGNOSIS — S83.32XD TEAR OF ARTICULAR CARTILAGE OF LEFT KNEE, CURRENT, SUBSEQUENT ENCOUNTER: ICD-10-CM

## 2024-06-11 DIAGNOSIS — S83.232A COMPLEX TEAR OF MEDIAL MENISCUS, CURRENT INJURY, LEFT KNEE, INITIAL ENCOUNTER: ICD-10-CM

## 2024-06-11 PROCEDURE — 99214 OFFICE O/P EST MOD 30 MIN: CPT

## 2024-06-11 NOTE — IMAGING
[de-identified] :  LEFT KNEE Inspection:  well healed surg scars, mild effusion  Palpation: medial facet ttp and medial joint line ttp Knee Range of Motion:  0-130 degrees Strength: 5-/5 Quadriceps strength, 5-/5 Hamstring strength, 4/5 hip abductors Neurological: light touch is intact throughout Ligament Stability and Special Tests:  McMurrays: pos Lachman: neg Pivot Shift: neg Posterior Drawer: neg Valgus: neg Varus: neg Patella Apprehension: neg Patella Maltracking: neg

## 2024-06-11 NOTE — ASSESSMENT
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 The natural history of the pathology was explained to the patient in layman's terms. Several different treatment options were discussed and explained including the risks and benefits of both surgical and non-surgical treatments. All questions and concerns were answered. - Given their active lifestyle along with continued pain and mechanical symptoms despite conservative treatment they are a surgical candidate. - The risks, benefits, and alternatives to left  knee PMM, synov were discussed with the patient, all questions were answered.

## 2024-06-11 NOTE — DISCUSSION/SUMMARY

## 2024-06-11 NOTE — HISTORY OF PRESENT ILLNESS
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  seen dr. Monaco for both knees and sent for consult for left knee chondral defect. The pain is located medial, lateral, ant, and deep The pain is associated with  swelling, catching and buckling Worse with activity and better at rest. Has tried PT sine june 2022 H/O opposite right knee arthritis seens Dr. Monaco for h/o right meniscus surgery  ***s/p L knee abras (C 1.8cm AP X 1.4cm ML), PMM, synov, biopsy on 4/14/23***  4/20/23 - po visit, no new issues 6/15/23 - doing PT/HEP in [place] still some pain in knee  *** s/p L knee MARICRUZ [place] [place] 7/28/23***  8/3/23 - po visit , using brace and crtuches, pain well controlled 9/28/23 - doing PT on protocol 11/16/23 - cont with PT and HEP working with Birgit  1/11/24- cont with PT and HEP, improving 3/7/24- L knee improving although has some occasional discomfort with stairs and achiness with activtiy 5/2/24- L knee improving, complains of some discomfort with attempting to jog, has been doing HEP 6/11/24 - cont to have pain meial and deep in knee assoc with sense of [id number], had mri

## 2024-06-13 ENCOUNTER — NON-APPOINTMENT (OUTPATIENT)
Age: 44
End: 2024-06-13

## 2024-07-12 ENCOUNTER — APPOINTMENT (OUTPATIENT)
Age: 44
End: 2024-07-12
Payer: COMMERCIAL

## 2024-07-12 DIAGNOSIS — S83.232A COMPLEX TEAR OF MEDIAL MENISCUS, CURRENT INJURY, LEFT KNEE, INITIAL ENCOUNTER: ICD-10-CM

## 2024-07-12 PROCEDURE — 29875 ARTHRS KNEE SURG SYNVCT LMTD: CPT | Mod: AS,LT

## 2024-07-12 PROCEDURE — 29881 ARTHRS KNE SRG MNISECTMY M/L: CPT | Mod: LT

## 2024-07-12 PROCEDURE — 29881 ARTHRS KNE SRG MNISECTMY M/L: CPT | Mod: AS,LT

## 2024-07-12 PROCEDURE — 29875 ARTHRS KNEE SURG SYNVCT LMTD: CPT | Mod: LT

## 2024-07-12 RX ORDER — IBUPROFEN 600 MG/1
600 TABLET ORAL EVERY 6 HOURS
Qty: 20 | Refills: 0 | Status: ACTIVE | COMMUNITY
Start: 2024-07-12 | End: 1900-01-01

## 2024-07-12 RX ORDER — PROMETHAZINE HYDROCHLORIDE 12.5 MG/1
12.5 TABLET ORAL EVERY 6 HOURS
Qty: 20 | Refills: 0 | Status: ACTIVE | COMMUNITY
Start: 2024-07-12 | End: 1900-01-01

## 2024-07-12 RX ORDER — ACETAMINOPHEN 500 MG/1
500 TABLET ORAL 3 TIMES DAILY
Qty: 90 | Refills: 0 | Status: ACTIVE | COMMUNITY
Start: 2024-07-12 | End: 1900-01-01

## 2024-07-12 RX ORDER — OXYCODONE 5 MG/1
5 TABLET ORAL EVERY 6 HOURS
Qty: 10 | Refills: 0 | Status: ACTIVE | COMMUNITY
Start: 2024-07-12 | End: 1900-01-01

## 2024-07-15 PROBLEM — S83.232D COMPLEX TEAR OF MEDIAL MENISCUS OF LEFT KNEE, SUBSEQUENT ENCOUNTER: Status: ACTIVE | Noted: 2024-07-15

## 2024-07-16 ENCOUNTER — APPOINTMENT (OUTPATIENT)
Dept: ORTHOPEDIC SURGERY | Facility: CLINIC | Age: 44
End: 2024-07-16
Payer: COMMERCIAL

## 2024-07-16 DIAGNOSIS — S83.232D COMPLEX TEAR OF MEDIAL MENISCUS, CURRENT INJURY, LEFT KNEE, SUBSEQUENT ENCOUNTER: ICD-10-CM

## 2024-07-16 PROCEDURE — 99024 POSTOP FOLLOW-UP VISIT: CPT

## 2024-07-22 ENCOUNTER — NON-APPOINTMENT (OUTPATIENT)
Age: 44
End: 2024-07-22

## 2024-09-09 PROBLEM — S83.232A COMPLEX TEAR OF MEDIAL MENISCUS OF LEFT KNEE AS CURRENT INJURY, INITIAL ENCOUNTER: Status: RESOLVED | Noted: 2024-06-10 | Resolved: 2024-09-09

## 2024-09-10 ENCOUNTER — APPOINTMENT (OUTPATIENT)
Dept: ORTHOPEDIC SURGERY | Facility: CLINIC | Age: 44
End: 2024-09-10
Payer: COMMERCIAL

## 2024-09-10 VITALS — BODY MASS INDEX: 28.7 KG/M2 | HEIGHT: 71 IN | WEIGHT: 205 LBS

## 2024-09-10 DIAGNOSIS — S83.232D COMPLEX TEAR OF MEDIAL MENISCUS, CURRENT INJURY, LEFT KNEE, SUBSEQUENT ENCOUNTER: ICD-10-CM

## 2024-09-10 DIAGNOSIS — M17.12 UNILATERAL PRIMARY OSTEOARTHRITIS, LEFT KNEE: ICD-10-CM

## 2024-09-10 DIAGNOSIS — S83.232A COMPLEX TEAR OF MEDIAL MENISCUS, CURRENT INJURY, LEFT KNEE, INITIAL ENCOUNTER: ICD-10-CM

## 2024-09-10 PROCEDURE — 99024 POSTOP FOLLOW-UP VISIT: CPT

## 2024-09-10 NOTE — ASSESSMENT
[FreeTextEntry1] : s/p L knee PMM, synov on 7/12/24 (some arthritis)   - We discussed their diagnosis and treatment options at length including the risks and benefits of both surgical treatment with a knee replacement and non-surgical options. Surgical risks include but are not limited to pain, infection, bleeding, vascular injury, numbness, tingling, nerve damage. - Due to risks of surgery, they will continue conservative treatment with PT, icing, and anti-inflammatory medications - The patient was provided with a PT prescription to work on ROM, hip ER/abductors strengthening, quad/hamstring stretches and strengthening, and other exercises. - The patient was advised to let pain guide the gradual advancement of activities. - Discussed possible injections along with the risks, benefits, and alternatives and the patient choose to hold off - We discussed the possible of injections in the future. - Follow up in 6 weeks as to re-evaluate

## 2024-09-10 NOTE — HISTORY OF PRESENT ILLNESS
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  seen dr. Monaco for both knees and sent for consult for left knee chondral defect. The pain is located medial, lateral, ant, and deep The pain is associated with  swelling, catching and buckling Worse with activity and better at rest. Has tried PT sine june 2022 H/O opposite right knee arthritis seens Dr. Monaco for h/o right meniscus surgery ***s/p L knee abras (C 1.8cm AP X 1.4cm ML), PMM, synov, biopsy on 4/14/23*** *** s/p L knee MARICRUZ ([facility] [place] 7/28/23***  8/3/23 - po visit , using brace and crtuches, pain well controlled 9/28/23 - doing PT on protocol 11/16/23 - cont with PT and HEP working with Birgit  1/11/24- cont with PT and HEP, improving 3/7/24- L knee improving although has some occasional discomfort with stairs and achiness with activtiy 5/2/24- L knee improving, complains of some discomfort with attempting to jog, has been doing HEP 6/11/24 - cont to have pain meial and deep in knee assoc with sense of [id number], had mri  ***s/p L knee PMM, synov on 7/12/24 ***   7/16/24 - pain well controlled, startting PT in [place] office 9/10/24 - doing PT back on protocol, intermittent HEP. hvaing achiness ant-medially

## 2024-09-10 NOTE — HISTORY OF PRESENT ILLNESS
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  seen dr. Monaco for both knees and sent for consult for left knee chondral defect. The pain is located medial, lateral, ant, and deep The pain is associated with  swelling, catching and buckling Worse with activity and better at rest. Has tried PT sine june 2022 H/O opposite right knee arthritis seens Dr. Monaco for h/o right meniscus surgery ***s/p L knee abras (C 1.8cm AP X 1.4cm ML), PMM, synov, biopsy on 4/14/23*** *** s/p L knee MARICRUZ ([facility] [place]) on 7/28/23***  8/3/23 - po visit , using brace and crtuches, pain well controlled 9/28/23 - doing PT on protocol 11/16/23 - cont with PT and HEP working with Birgit  1/11/24- cont with PT and HEP, improving 3/7/24- L knee improving although has some occasional discomfort with stairs and achiness with activtiy 5/2/24- L knee improving, complains of some discomfort with attempting to jog, has been doing HEP 6/11/24 - cont to have pain meial and deep in knee assoc with sense of [id number], had mri  ***s/p L knee PMM, synov on 7/12/24 ***   7/16/24 - pain well controlled, startting PT in [place] office 9/10/24 - doing PT back on protocol, intermittent HEP. hvaing achiness ant-medially

## 2024-09-10 NOTE — IMAGING
[de-identified] :   LEFT KNEE Inspection:  well healed surg scars, mild effusion Palpation: medial facet ttp Knee Range of Motion:  0-130 Strength: 4+/5 Quadriceps strength, 5-/5 Hamstring strength, 4/5 hip abductors Neurological: light touch is intact throughout Ligament Stability and Special Tests:  McMurrays: neg Lachman: neg Pivot Shift: neg Posterior Drawer: neg Valgus: neg Varus: neg Patella Apprehension: neg Patella Maltracking: neg

## 2024-09-10 NOTE — IMAGING
[de-identified] :   LEFT KNEE Inspection:  well healed surg scars, mild effusion Palpation: medial facet ttp Knee Range of Motion:  0-130 Strength: 4+/5 Quadriceps strength, 5-/5 Hamstring strength, 4/5 hip abductors Neurological: light touch is intact throughout Ligament Stability and Special Tests:  McMurrays: neg Lachman: neg Pivot Shift: neg Posterior Drawer: neg Valgus: neg Varus: neg Patella Apprehension: neg Patella Maltracking: neg

## 2024-10-22 ENCOUNTER — APPOINTMENT (OUTPATIENT)
Dept: ORTHOPEDIC SURGERY | Facility: CLINIC | Age: 44
End: 2024-10-22
Payer: COMMERCIAL

## 2024-10-22 VITALS — HEIGHT: 71 IN | BODY MASS INDEX: 28.7 KG/M2 | WEIGHT: 205 LBS

## 2024-10-22 DIAGNOSIS — M17.12 UNILATERAL PRIMARY OSTEOARTHRITIS, LEFT KNEE: ICD-10-CM

## 2024-10-22 DIAGNOSIS — S83.232D COMPLEX TEAR OF MEDIAL MENISCUS, CURRENT INJURY, LEFT KNEE, SUBSEQUENT ENCOUNTER: ICD-10-CM

## 2024-10-22 PROCEDURE — 99213 OFFICE O/P EST LOW 20 MIN: CPT

## 2025-01-28 ENCOUNTER — APPOINTMENT (OUTPATIENT)
Dept: ORTHOPEDIC SURGERY | Facility: CLINIC | Age: 45
End: 2025-01-28